# Patient Record
Sex: MALE | Race: WHITE | NOT HISPANIC OR LATINO | URBAN - METROPOLITAN AREA
[De-identification: names, ages, dates, MRNs, and addresses within clinical notes are randomized per-mention and may not be internally consistent; named-entity substitution may affect disease eponyms.]

---

## 2022-10-03 ENCOUNTER — OFFICE VISIT (OUTPATIENT)
Dept: URGENT CARE | Facility: PHYSICIAN GROUP | Age: 82
End: 2022-10-03
Payer: MEDICARE

## 2022-10-03 VITALS
DIASTOLIC BLOOD PRESSURE: 88 MMHG | SYSTOLIC BLOOD PRESSURE: 140 MMHG | BODY MASS INDEX: 35.36 KG/M2 | OXYGEN SATURATION: 98 % | HEIGHT: 66 IN | WEIGHT: 220 LBS | HEART RATE: 56 BPM | TEMPERATURE: 97.7 F | RESPIRATION RATE: 16 BRPM

## 2022-10-03 DIAGNOSIS — H66.002 NON-RECURRENT ACUTE SUPPURATIVE OTITIS MEDIA OF LEFT EAR WITHOUT SPONTANEOUS RUPTURE OF TYMPANIC MEMBRANE: ICD-10-CM

## 2022-10-03 PROCEDURE — 99203 OFFICE O/P NEW LOW 30 MIN: CPT | Performed by: FAMILY MEDICINE

## 2022-10-03 RX ORDER — MULTIVITAMIN
1 CAPSULE ORAL DAILY
COMMUNITY

## 2022-10-03 RX ORDER — METOPROLOL SUCCINATE 100 MG/1
100 TABLET, EXTENDED RELEASE ORAL DAILY
COMMUNITY

## 2022-10-03 RX ORDER — OLMESARTAN MEDOXOMIL 40 MG/1
40 TABLET ORAL DAILY
COMMUNITY
Start: 2022-08-16

## 2022-10-03 RX ORDER — AMOXICILLIN 875 MG/1
875 TABLET, COATED ORAL 2 TIMES DAILY
Qty: 14 TABLET | Refills: 0 | Status: SHIPPED | OUTPATIENT
Start: 2022-10-03 | End: 2022-10-10

## 2022-10-03 ASSESSMENT — ENCOUNTER SYMPTOMS: FEVER: 0

## 2022-10-03 NOTE — PROGRESS NOTES
"Subjective:     Francois Hale is a 81 y.o. 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   HENT:  Positive for hearing loss.    Skin:  Negative for rash.     PMH:  has no past medical history on file.  MEDS:   Current Outpatient Medications:     metoprolol SR (TOPROL XL) 100 MG TABLET SR 24 HR, Take 100 mg by mouth every day., Disp: , Rfl:     Cholecalciferol 1000 UNIT Cap, Take 2 Capsules by mouth every day., Disp: , Rfl:     olmesartan (BENICAR) 40 MG Tab, Take 40 mg by mouth every day., Disp: , Rfl:     Multiple Vitamin (MULTIVITAMIN) capsule, Take 1 Capsule by mouth every day., Disp: , Rfl:   ALLERGIES:   Allergies   Allergen Reactions    Doxazosin     Statins [Hmg-Coa-R Inhibitors]      SURGHX: No past surgical history on file.  SOCHX:       Objective:   BP (!) 140/88 (BP Location: Left arm, Patient Position: Sitting, BP Cuff Size: Small adult)   Pulse (!) 56   Temp 36.5 °C (97.7 °F) (Temporal)   Resp 16   Ht 1.676 m (5' 6\")   Wt 99.8 kg (220 lb)   SpO2 98%   BMI 35.51 kg/m²     Physical Exam  Constitutional:       General: He is not in acute distress.     Appearance: He is well-developed. He is not diaphoretic.   HENT:      Head: Normocephalic and atraumatic.      Right Ear: Tympanic membrane, ear canal and external ear normal.      Left Ear: Ear canal and external ear normal.      Ears:      Comments: Left tympanic membrane erythematous with moderate purulent effusion present, mild erythema of the ear canal, has a few minor scratches in the ear canal with scabbing, no " significant discharge in ear canal, no active bleeding  Pulmonary:      Effort: Pulmonary effort is normal.   Musculoskeletal:      Cervical back: Normal range of motion and neck supple. No tenderness.   Lymphadenopathy:      Cervical: No cervical adenopathy.   Neurological:      Mental Status: He is alert.       Assessment/Plan:   Assessment    1. Non-recurrent acute suppurative otitis media of left ear without spontaneous rupture of tympanic membrane  - amoxicillin (AMOXIL) 875 MG tablet; Take 1 Tablet by mouth 2 times a day for 7 days.  Dispense: 14 Tablet; Refill: 0    Other orders  - metoprolol SR (TOPROL XL) 100 MG TABLET SR 24 HR; Take 100 mg by mouth every day.  - Cholecalciferol 1000 UNIT Cap; Take 2 Capsules by mouth every day.  - olmesartan (BENICAR) 40 MG Tab; Take 40 mg by mouth every day.  - Multiple Vitamin (MULTIVITAMIN) capsule; Take 1 Capsule by mouth every day.    Patient with left otitis media.  He also has some irritation of the ear canal and advised to avoid putting anything into his ear until his symptoms are fully resolved.  He states that he normally cleans his ear with a bent paperclip and recommended not to do this in the future.  Recommended follow-up with his PCP after completion of antibiotics to ensure full resolution.  Patient agreeable and will follow up with PCP [place].